# Patient Record
Sex: MALE | Race: WHITE | URBAN - METROPOLITAN AREA
[De-identification: names, ages, dates, MRNs, and addresses within clinical notes are randomized per-mention and may not be internally consistent; named-entity substitution may affect disease eponyms.]

---

## 2020-05-18 NOTE — PATIENT DISCUSSION
Patient advised of the right to post-operative care by the surgeon. Patient is fully informed of, and agreed to, co-management with their primary optometric physician. Post-operative care by the surgeon is not medically necessary and co-management is clinically appropriate. Patient has received itemization of fees related to cataract surgery. Transfer of care letter completed for the patient. Transfer care of the left eye to Dr. Maurilio Ferrell on 05-. Patient instructed to call immediately if any new distortion, blurring, decreased vision or eye pain.

## 2020-05-26 NOTE — PATIENT DISCUSSION
Cataract surgery has been performed in the first eye and activities of daily living are still impaired. The patient would like to proceed with cataract surgery in the second eye as scheduled. The patient elects TMF +3.25 OD, goal of emmetropia.  dec for Sx OD made today with MAIKEL.

## 2020-05-26 NOTE — PATIENT DISCUSSION
Patient advised of the right to post-operative care by the surgeon. Patient is fully informed of, and agreed to, co-management with their primary optometric physician. Post-operative care by the surgeon is not medically necessary and co-management is clinically appropriate. Patient has received itemization of fees related to cataract surgery. Transfer of care letter completed for the patient. Transfer care of right eye to Dr. Estevan Rader on 5/26/2020. Patient instructed to call immediately if any new distortion, blurring, decreased vision or eye pain.

## 2020-08-10 ENCOUNTER — IMPORTED ENCOUNTER (OUTPATIENT)
Dept: URBAN - METROPOLITAN AREA CLINIC 50 | Facility: CLINIC | Age: 70
End: 2020-08-10

## 2020-10-05 ENCOUNTER — IMPORTED ENCOUNTER (OUTPATIENT)
Dept: URBAN - METROPOLITAN AREA CLINIC 50 | Facility: CLINIC | Age: 70
End: 2020-10-05

## 2021-04-17 ASSESSMENT — VISUAL ACUITY
OS_CC: J1+
OS_BAT: 20/20
OD_BAT: 20/20
OD_CC: J1+
OS_OTHER: 20/20. 20/30.
OD_OTHER: 20/20. 20/60.
OD_CC: 20/25
OS_CC: 20/25

## 2021-04-17 ASSESSMENT — TONOMETRY
OD_IOP_MMHG: 14
OS_IOP_MMHG: 16

## 2021-09-28 ENCOUNTER — PREPPED CHART (OUTPATIENT)
Dept: URBAN - METROPOLITAN AREA CLINIC 53 | Facility: CLINIC | Age: 71
End: 2021-09-28

## 2021-10-04 ENCOUNTER — ANNUAL COMPREHENSIVE EXAM (OUTPATIENT)
Dept: URBAN - METROPOLITAN AREA CLINIC 53 | Facility: CLINIC | Age: 71
End: 2021-10-04

## 2021-10-04 DIAGNOSIS — H04.123: ICD-10-CM

## 2021-10-04 DIAGNOSIS — H43.813: ICD-10-CM

## 2021-10-04 DIAGNOSIS — H25.813: ICD-10-CM

## 2021-10-04 PROCEDURE — 92014 COMPRE OPH EXAM EST PT 1/>: CPT

## 2021-10-04 ASSESSMENT — VISUAL ACUITY
OD_CC: CL 20/20
OS_GLARE: 20/50
OD_GLARE: 20/20
OU_CC: CL 20/20
OS_CC: CL 20/20
OS_GLARE: 20/250

## 2021-10-04 ASSESSMENT — TONOMETRY
OS_IOP_MMHG: 14
OD_IOP_MMHG: 13

## 2022-10-17 ENCOUNTER — ESTABLISHED PATIENT (OUTPATIENT)
Dept: URBAN - METROPOLITAN AREA CLINIC 53 | Facility: CLINIC | Age: 72
End: 2022-10-17

## 2022-10-17 DIAGNOSIS — H25.813: ICD-10-CM

## 2022-10-17 DIAGNOSIS — H04.123: ICD-10-CM

## 2022-10-17 DIAGNOSIS — H43.813: ICD-10-CM

## 2022-10-17 PROCEDURE — 92014 COMPRE OPH EXAM EST PT 1/>: CPT

## 2022-10-17 ASSESSMENT — VISUAL ACUITY
OS_GLARE: 20/25
OS_PH: 20/20
OD_GLARE: 20/20
OS_CC: 20/30
OU_CC: J1 @ 16IN
OD_CC: 20/25-1
OD_GLARE: 20/20
OS_GLARE: 20/20

## 2022-10-17 ASSESSMENT — TONOMETRY
OS_IOP_MMHG: 13
OD_IOP_MMHG: 13

## 2023-10-23 ENCOUNTER — ESTABLISHED PATIENT (OUTPATIENT)
Dept: URBAN - METROPOLITAN AREA CLINIC 53 | Facility: CLINIC | Age: 73
End: 2023-10-23

## 2023-10-23 DIAGNOSIS — H43.813: ICD-10-CM

## 2023-10-23 DIAGNOSIS — H25.813: ICD-10-CM

## 2023-10-23 PROCEDURE — 92014 COMPRE OPH EXAM EST PT 1/>: CPT

## 2023-10-23 ASSESSMENT — VISUAL ACUITY
OU_CC: J2@14
OU_SC: J1+@10
OD_GLARE: 20/30
OS_CC: 20/25-1
OD_CC: 20/25
OS_SC: 20/100
OS_GLARE: 20/20
OD_GLARE: 20/25
OD_SC: 20/80
OS_GLARE: 20/20

## 2023-10-23 ASSESSMENT — TONOMETRY
OD_IOP_MMHG: 14
OS_IOP_MMHG: 14

## 2024-10-28 ENCOUNTER — COMPREHENSIVE EXAM (OUTPATIENT)
Dept: URBAN - METROPOLITAN AREA CLINIC 53 | Facility: CLINIC | Age: 74
End: 2024-10-28

## 2024-10-28 DIAGNOSIS — H25.813: ICD-10-CM

## 2024-10-28 DIAGNOSIS — H43.813: ICD-10-CM

## 2024-10-28 DIAGNOSIS — H04.123: ICD-10-CM

## 2024-10-28 PROCEDURE — 99214 OFFICE O/P EST MOD 30 MIN: CPT
